# Patient Record
Sex: FEMALE | Race: WHITE | NOT HISPANIC OR LATINO
[De-identification: names, ages, dates, MRNs, and addresses within clinical notes are randomized per-mention and may not be internally consistent; named-entity substitution may affect disease eponyms.]

---
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
## 2019-02-07 ENCOUNTER — APPOINTMENT (OUTPATIENT)
Dept: PEDIATRICS | Facility: CLINIC | Age: 19
End: 2019-02-07
Payer: COMMERCIAL

## 2019-02-07 VITALS
SYSTOLIC BLOOD PRESSURE: 110 MMHG | TEMPERATURE: 98.3 F | HEART RATE: 83 BPM | BODY MASS INDEX: 36.34 KG/M2 | HEIGHT: 62.25 IN | WEIGHT: 200 LBS | OXYGEN SATURATION: 98 % | DIASTOLIC BLOOD PRESSURE: 76 MMHG

## 2019-02-07 PROCEDURE — 99214 OFFICE O/P EST MOD 30 MIN: CPT

## 2019-02-07 RX ORDER — NORETHINDRONE AND ETHINYL ESTRADIOL 0.4-0.035
KIT ORAL
Refills: 0 | Status: ACTIVE | COMMUNITY

## 2019-02-07 NOTE — PHYSICAL EXAM
[No Acute Distress] : no acute distress [Clear TM bilaterally] : clear tympanic membranes bilaterally [Nontender Cervical Lymph Nodes] : nontender cervical lymph nodes [Moves All Extremities x 4] : moves all extremities x4 [Warm, Well Perfused x4] : warm, well perfused x4 [Capillary Refill <2s] : capillary refill < 2s [Straight] : straight [Normotonic] : normotonic [NL] : warm [de-identified] : +tenderness along bilateral trapezius and along the cervical, thoracic and lumbar spine  [de-identified] : Cranial nerves 2-12 intact

## 2019-02-07 NOTE — DISCUSSION/SUMMARY
[FreeTextEntry1] : 19 yo here with whip lash injury \par neuro exam intact \par Start Motrin q 6 hours x 3 days \par Flexeril Q8 PRN pain/muscle spasm, no driving on muscle relaxant \par Can use heat pad, hot shower and rest \par If pain worsens or if patient develops numbness or tingling or other symptoms should f/u or go to ED \par Return on Saturday for recheck \par

## 2019-02-27 ENCOUNTER — APPOINTMENT (OUTPATIENT)
Dept: ENDOCRINOLOGY | Facility: CLINIC | Age: 19
End: 2019-02-27
Payer: COMMERCIAL

## 2019-02-27 ENCOUNTER — TRANSCRIPTION ENCOUNTER (OUTPATIENT)
Age: 19
End: 2019-02-27

## 2019-02-27 VITALS
SYSTOLIC BLOOD PRESSURE: 132 MMHG | HEART RATE: 70 BPM | BODY MASS INDEX: 35.52 KG/M2 | DIASTOLIC BLOOD PRESSURE: 84 MMHG | HEIGHT: 62.5 IN | WEIGHT: 198 LBS

## 2019-02-27 DIAGNOSIS — Z78.9 OTHER SPECIFIED HEALTH STATUS: ICD-10-CM

## 2019-02-27 DIAGNOSIS — Z83.3 FAMILY HISTORY OF DIABETES MELLITUS: ICD-10-CM

## 2019-02-27 DIAGNOSIS — Z82.49 FAMILY HISTORY OF ISCHEMIC HEART DISEASE AND OTHER DISEASES OF THE CIRCULATORY SYSTEM: ICD-10-CM

## 2019-02-27 PROCEDURE — 99244 OFF/OP CNSLTJ NEW/EST MOD 40: CPT

## 2019-02-27 RX ORDER — CYCLOBENZAPRINE HYDROCHLORIDE 5 MG/1
5 TABLET, FILM COATED ORAL 3 TIMES DAILY
Qty: 15 | Refills: 0 | Status: DISCONTINUED | COMMUNITY
Start: 2019-02-07 | End: 2019-02-27

## 2019-03-02 ENCOUNTER — APPOINTMENT (OUTPATIENT)
Dept: PEDIATRICS | Facility: CLINIC | Age: 19
End: 2019-03-02

## 2019-03-20 ENCOUNTER — APPOINTMENT (OUTPATIENT)
Dept: ENDOCRINOLOGY | Facility: CLINIC | Age: 19
End: 2019-03-20

## 2019-05-08 ENCOUNTER — APPOINTMENT (OUTPATIENT)
Dept: ENDOCRINOLOGY | Facility: CLINIC | Age: 19
End: 2019-05-08
Payer: COMMERCIAL

## 2019-05-08 VITALS
HEART RATE: 96 BPM | SYSTOLIC BLOOD PRESSURE: 124 MMHG | WEIGHT: 198 LBS | DIASTOLIC BLOOD PRESSURE: 80 MMHG | HEIGHT: 62.5 IN | BODY MASS INDEX: 35.52 KG/M2

## 2019-05-08 PROCEDURE — 99214 OFFICE O/P EST MOD 30 MIN: CPT

## 2019-08-01 ENCOUNTER — APPOINTMENT (OUTPATIENT)
Dept: ENDOCRINOLOGY | Facility: CLINIC | Age: 19
End: 2019-08-01

## 2019-10-08 ENCOUNTER — APPOINTMENT (OUTPATIENT)
Dept: PEDIATRICS | Facility: CLINIC | Age: 19
End: 2019-10-08
Payer: COMMERCIAL

## 2019-10-08 VITALS — HEART RATE: 112 BPM | WEIGHT: 198 LBS | TEMPERATURE: 98.2 F | OXYGEN SATURATION: 98 %

## 2019-10-08 DIAGNOSIS — R50.9 FEVER, UNSPECIFIED: ICD-10-CM

## 2019-10-08 DIAGNOSIS — Z87.828 PERSONAL HISTORY OF OTHER (HEALED) PHYSICAL INJURY AND TRAUMA: ICD-10-CM

## 2019-10-08 PROCEDURE — 99213 OFFICE O/P EST LOW 20 MIN: CPT

## 2019-10-08 RX ORDER — LEVOTHYROXINE SODIUM 50 UG/1
50 TABLET ORAL
Qty: 90 | Refills: 1 | Status: DISCONTINUED | COMMUNITY
Start: 2019-02-27 | End: 2019-10-08

## 2019-10-08 NOTE — HISTORY OF PRESENT ILLNESS
[FreeTextEntry6] : 19 years old here for follow up\par has been seen for fever twice at urgent care and last visit blood work showed mom\par she is on no meds \par still has low grade fever

## 2019-10-08 NOTE — PHYSICAL EXAM
[NL] : warm [Soft] : soft [NonTender] : non tender [Non Distended] : non distended [No Hepatosplenomegaly] : no hepatosplenomegaly

## 2019-10-08 NOTE — DISCUSSION/SUMMARY
[FreeTextEntry1] : 19 years old with fever,mono\par needs a note for college\par still has little fever\par i have advised her to come back in 3 days if fever does not subside

## 2019-10-11 ENCOUNTER — LABORATORY RESULT (OUTPATIENT)
Age: 19
End: 2019-10-11

## 2019-10-11 ENCOUNTER — APPOINTMENT (OUTPATIENT)
Dept: PEDIATRICS | Facility: CLINIC | Age: 19
End: 2019-10-11
Payer: COMMERCIAL

## 2019-10-11 VITALS
OXYGEN SATURATION: 98 % | DIASTOLIC BLOOD PRESSURE: 72 MMHG | SYSTOLIC BLOOD PRESSURE: 116 MMHG | TEMPERATURE: 98.5 F | HEART RATE: 122 BPM

## 2019-10-11 DIAGNOSIS — R50.9 FEVER, UNSPECIFIED: ICD-10-CM

## 2019-10-11 DIAGNOSIS — B27.90 INFECTIOUS MONONUCLEOSIS, UNSPECIFIED W/OUT COMPLICATION: ICD-10-CM

## 2019-10-11 DIAGNOSIS — J32.9 CHRONIC SINUSITIS, UNSPECIFIED: ICD-10-CM

## 2019-10-11 LAB
FLUAV SPEC QL CULT: NEGATIVE
S PYO AG SPEC QL IA: NEGATIVE

## 2019-10-11 PROCEDURE — 87880 STREP A ASSAY W/OPTIC: CPT | Mod: QW

## 2019-10-11 PROCEDURE — 87804 INFLUENZA ASSAY W/OPTIC: CPT | Mod: QW

## 2019-10-11 PROCEDURE — 99214 OFFICE O/P EST MOD 30 MIN: CPT

## 2019-10-11 NOTE — DISCUSSION/SUMMARY
[FreeTextEntry1] : 20 yo here with sinus infection persistent fever\par Rapid flu neg, rapid strep negative \par Sent for BW \par Will start doxycycline for sinus infection given crossreactivity of Amox and Mono \par Flonase \par Follow up PRN worsening symptoms, persistent fever of 100.4 or more or failure to improve.\par \par

## 2019-10-11 NOTE — REVIEW OF SYSTEMS
[Fever] : fever [Chills] : chills [Night Sweats] : night sweats [Nasal Congestion] : nasal congestion [Headache] : headache [Sore Throat] : sore throat [Sinus Pressure] : sinus pressure [Abdominal Pain] : abdominal pain [Negative] : Genitourinary

## 2019-10-11 NOTE — HISTORY OF PRESENT ILLNESS
[FreeTextEntry6] : LUZ NOLAN is a 19 year old female presenting for fever. \par She was [facility] and diagnosed with a febrile illness\par She was then seen again at [facility] on 10/3 and diagnosed with Mono. \par \par On 9/27 had 102 which persisted and last week up to 103. \par Tmax in the last 5 days is 101.7 and has had daily fevers of 100.4 at least once/day \par Has throat pain, pain in the glands, some abdominal pain but improving abdominal pain. \par Headache, frontal pressure worsening. \par +nasal congestion x 1.5 weeks \par \par patient cell [phone]

## 2019-10-11 NOTE — PHYSICAL EXAM
[EOMI] : EOMI [No Acute Distress] : no acute distress [Clear TM bilaterally] : clear tympanic membranes bilaterally [Inflamed Nasal Mucosa] : inflamed nasal mucosa [Erythematous Oropharynx] : erythematous oropharynx [Enlarged Tonsils] : enlarged tonsils  [+3] :  ( +3 ) [Exudate] : exudate [Tender cervical lymph nodes] : tender cervical lymph nodes  [Clear to Ausculatation Bilaterally] : clear to auscultation bilaterally [Regular Rate and Rhythm] : regular rate and rhythm [Warm] : warm

## 2019-10-12 LAB
ALBUMIN SERPL ELPH-MCNC: 3.6 G/DL
ALP BLD-CCNC: 356 U/L
ALT SERPL-CCNC: 91 U/L
ANION GAP SERPL CALC-SCNC: 14 MMOL/L
AST SERPL-CCNC: 74 U/L
BILIRUB SERPL-MCNC: 1 MG/DL
BUN SERPL-MCNC: 4 MG/DL
CALCIUM SERPL-MCNC: 8.6 MG/DL
CHLORIDE SERPL-SCNC: 104 MMOL/L
CO2 SERPL-SCNC: 21 MMOL/L
CREAT SERPL-MCNC: 0.55 MG/DL
GLUCOSE SERPL-MCNC: 102 MG/DL
POTASSIUM SERPL-SCNC: 4.3 MMOL/L
PROT SERPL-MCNC: 6.3 G/DL
SODIUM SERPL-SCNC: 139 MMOL/L

## 2019-10-13 ENCOUNTER — MOBILE ON CALL (OUTPATIENT)
Age: 19
End: 2019-10-13

## 2019-10-14 ENCOUNTER — RESULT REVIEW (OUTPATIENT)
Age: 19
End: 2019-10-14

## 2019-10-14 LAB
BACTERIA THROAT CULT: NORMAL
BASOPHILS # BLD AUTO: 0 K/UL
BASOPHILS NFR BLD AUTO: 0 %
EOSINOPHIL # BLD AUTO: 0 K/UL
EOSINOPHIL NFR BLD AUTO: 0 %
HCT VFR BLD CALC: 46 %
HGB BLD-MCNC: 14.5 G/DL
LYMPHOCYTES # BLD AUTO: 4.29 K/UL
LYMPHOCYTES NFR BLD AUTO: 36.5 %
MAN DIFF?: NORMAL
MCHC RBC-ENTMCNC: 29.7 PG
MCHC RBC-ENTMCNC: 31.5 GM/DL
MCV RBC AUTO: 94.1 FL
MONOCYTES # BLD AUTO: 0.51 K/UL
MONOCYTES NFR BLD AUTO: 4.3 %
NEUTROPHILS # BLD AUTO: 3.58 K/UL
NEUTROPHILS NFR BLD AUTO: 24.4 %
PLATELET # BLD AUTO: 266 K/UL
RAPID RVP RESULT: NOT DETECTED
RBC # BLD: 4.89 M/UL
RBC # FLD: 12.2 %
WBC # FLD AUTO: 11.75 K/UL

## 2019-10-14 RX ORDER — DOXYCYCLINE HYCLATE 100 MG/1
100 CAPSULE ORAL
Qty: 20 | Refills: 0 | Status: DISCONTINUED | COMMUNITY
Start: 2019-10-11 | End: 2019-10-14

## 2019-11-08 ENCOUNTER — APPOINTMENT (OUTPATIENT)
Dept: ENDOCRINOLOGY | Facility: CLINIC | Age: 19
End: 2019-11-08
Payer: COMMERCIAL

## 2019-11-08 VITALS
DIASTOLIC BLOOD PRESSURE: 66 MMHG | BODY MASS INDEX: 34.09 KG/M2 | WEIGHT: 190 LBS | HEART RATE: 91 BPM | SYSTOLIC BLOOD PRESSURE: 104 MMHG | HEIGHT: 62.5 IN

## 2019-11-08 LAB — HBA1C MFR BLD HPLC: 5

## 2019-11-08 PROCEDURE — 99214 OFFICE O/P EST MOD 30 MIN: CPT

## 2019-11-08 RX ORDER — CEFDINIR 300 MG/1
300 CAPSULE ORAL
Qty: 20 | Refills: 0 | Status: DISCONTINUED | COMMUNITY
Start: 2019-10-13 | End: 2019-11-08

## 2019-11-08 RX ORDER — IBUPROFEN 600 MG/1
600 TABLET, FILM COATED ORAL 3 TIMES DAILY
Qty: 30 | Refills: 0 | Status: DISCONTINUED | COMMUNITY
Start: 2019-02-07 | End: 2019-11-08

## 2019-11-08 NOTE — REASON FOR VISIT
[Follow-Up: _____] : a [unfilled] follow-up visit [FreeTextEntry1] : Hypothyroidism, PCOS, HLD, and Vitamin D Deficiency

## 2019-11-08 NOTE — ASSESSMENT
[Importance of Diet and Exercise] : importance of diet and exercise to improve glycemic control, achieve weight loss and improve cardiovascular health [Levothyroxine] : The patient was instructed to take Levothyroxine on an empty stomach, separate from vitamins, and wait at least 30 minutes before eating [FreeTextEntry1] : 20 y/o female with Hypothyroidism, PCOS, HLD, and Vitamin D Deficiency. Labs reviewed from 11/5/19 - A1C 5.0%, , Triglycerides 238, TSH 1.8, and Vitamin D 21\par \par Plan: \par Hypothyroidism: euthyroid clinically and biochemically\par - continue current dose of Levothyroxine 50 mcg daily\par - repeat TFTs in 6 months\par \par PCOS: continue OCPs\par - monitor A1C \par \par Vitamin D Deficiency: start vitamin D 2000 units daily \par - repeat labs in 6 months\par \par HLD: high triglycerides, no therapy recommended at this time\par - follow a low fat diet\par - increase routine exercise\par \par Labs and follow up visit in 6 months.

## 2019-11-08 NOTE — PHYSICAL EXAM
[Alert] : alert [No Acute Distress] : no acute distress [Well Nourished] : well nourished [No LAD] : no lymphadenopathy [Well Developed] : well developed [Thyroid Not Enlarged] : the thyroid was not enlarged [Supple] : the neck was supple [No Thyroid Nodules] : there were no palpable thyroid nodules [Normal Rate and Effort] : normal respiratory rhythm and effort [No Accessory Muscle Use] : no accessory muscle use [Normal S1, S2] : normal S1 and S2 [Normal Rate] : heart rate was normal  [Clear to Auscultation] : lungs were clear to auscultation bilaterally [Not Tender] : non-tender [Regular Rhythm] : with a regular rhythm [Normal Bowel Sounds] : normal bowel sounds [No Stigmata of Cushings Syndrome] : no stigmata of cushings syndrome [Soft] : abdomen soft [No Rash] : no rash [Normal Gait] : normal gait [No Tremors] : no tremors [No Motor Deficits] : the motor exam was normal [Oriented x3] : oriented to person, place, and time [Normal Insight/Judgement] : insight and judgment were intact [Normal Mood] : the mood was normal [Acanthosis Nigricans] : no acanthosis nigricans

## 2019-11-08 NOTE — REVIEW OF SYSTEMS
[Fatigue] : fatigue [Easy Bruising] : a tendency for easy bruising [Recent Weight Loss (___ Lbs)] : no recent weight loss [Recent Weight Gain (___ Lbs)] : no recent weight gain [Decreased Appetite] : appetite not decreased [Visual Field Defect] : no visual field defect [Blurry Vision] : no blurred vision [Dysphonia] : no dysphonia [Dysphagia] : no dysphagia [Palpitations] : no palpitations [Neck Pain] : no neck pain [Chest Pain] : no chest pain [Dry Skin] : no dry skin [Hair Loss] : no hair loss [Polyuria] : no polyuria [Dysuria] : no dysuria [Headache] : no headaches [Tremors] : no tremors [Depression] : no depression [Anxiety] : no anxiety [Heat Intolerance] : heat tolerant [Cold Intolerance] : cold tolerant [Swelling] : no swelling [FreeTextEntry2] : recently dx with mono

## 2019-11-08 NOTE — HISTORY OF PRESENT ILLNESS
[FreeTextEntry1] : Quality: Hypothyroidism \par Severity: mild \par Duration: 2/2019\par Onset: routine labs\par Modifying Factors: Better with medication \par Associated Symptoms: no neck pain, no dysphagia \par Family History of thyroid issue: Paternal Grandmother \par Family History of DM: Mother and Father \par Family History of PCOS: sister \par \par Notes:\par LMP: 10/23/19\par regular on OCPs - Balziva\par \par \par Current Regimen: Levothyroxine 50 mg daily - taking appropriately\par \par \par Labs reviewed: 11/5/19 - TSH 1.8\par \par Date of last thyroid sonogram: none\par

## 2019-11-30 ENCOUNTER — RX RENEWAL (OUTPATIENT)
Age: 19
End: 2019-11-30

## 2019-12-03 ENCOUNTER — RX RENEWAL (OUTPATIENT)
Age: 19
End: 2019-12-03

## 2020-03-22 ENCOUNTER — RX RENEWAL (OUTPATIENT)
Age: 20
End: 2020-03-22

## 2020-05-06 ENCOUNTER — LABORATORY RESULT (OUTPATIENT)
Age: 20
End: 2020-05-06

## 2020-05-07 ENCOUNTER — APPOINTMENT (OUTPATIENT)
Dept: ENDOCRINOLOGY | Facility: CLINIC | Age: 20
End: 2020-05-07
Payer: COMMERCIAL

## 2020-05-07 PROCEDURE — 36415 COLL VENOUS BLD VENIPUNCTURE: CPT

## 2020-05-08 ENCOUNTER — APPOINTMENT (OUTPATIENT)
Dept: ENDOCRINOLOGY | Facility: CLINIC | Age: 20
End: 2020-05-08
Payer: COMMERCIAL

## 2020-05-08 PROCEDURE — 99212 OFFICE O/P EST SF 10 MIN: CPT | Mod: 95

## 2020-05-08 NOTE — ASSESSMENT
[FreeTextEntry1] : 18 y/o female with Hypothyroidism, PCOS, HLD, and Vitamin D Deficiency. \par \par Plan: \par Follow up with PCP r/t white spots on tongue \par \par Hypothyroidism: euthyroid biochemically\par - continue current dose of Levothyroxine 50 mcg daily\par - repeat TFTs in 6 months\par \par PCOS: continue OCPs\par - monitor A1C \par \par Vitamin D Deficiency: continue vitamin D 2000 units daily \par - repeat labs in 6 months\par \par HLD: controlled with diet,  no therapy recommended at this time\par - follow a low fat diet\par - increase routine exercise\par \par Labs and follow up visit in 6 months. \par \par Start Time: 11:11\par End Time: 11:24

## 2020-05-08 NOTE — PHYSICAL EXAM
[Alert] : alert [No Acute Distress] : no acute distress [de-identified] : Physical exam deferred due to telehealth visit

## 2020-05-08 NOTE — HISTORY OF PRESENT ILLNESS
[Other Location: e.g. Home (Enter Location, City,State)___] : at [unfilled] [Patient] : the patient [Self] : self [Home] : at home, [unfilled] , at the time of the visit. [FreeTextEntry1] : Pt. reports having white spots on her tongue which come and go, especially with tiredness. \par \par Quality: Hypothyroidism \par Severity: mild \par Duration: 2/2019\par Onset: routine labs\par Modifying Factors: Better with medication \par Associated Symptoms: no neck pain, no dysphagia \par Family History of thyroid issue: Paternal Grandmother \par Family History of DM: Mother and Father \par Family History of PCOS: sister \par \par Notes:\par LMP: 5/6/20 - heavy \par regular on OCPs - Balziva\par Weight loss 10 pounds \par \par \par Current Regimen: Levothyroxine 50 mg daily - taking appropriately\par \par \par Labs reviewed: 5/7/20 - TSH 2.06, Free 1.6 \par A1C 5.1%, vitamin D 49\par \par Date of last thyroid sonogram: none\par

## 2020-11-25 ENCOUNTER — APPOINTMENT (OUTPATIENT)
Dept: ENDOCRINOLOGY | Facility: CLINIC | Age: 20
End: 2020-11-25
Payer: COMMERCIAL

## 2020-11-25 VITALS
SYSTOLIC BLOOD PRESSURE: 130 MMHG | WEIGHT: 184 LBS | DIASTOLIC BLOOD PRESSURE: 90 MMHG | HEART RATE: 86 BPM | OXYGEN SATURATION: 98 %

## 2020-11-25 PROCEDURE — 99214 OFFICE O/P EST MOD 30 MIN: CPT

## 2021-05-25 ENCOUNTER — APPOINTMENT (OUTPATIENT)
Dept: ENDOCRINOLOGY | Facility: CLINIC | Age: 21
End: 2021-05-25
Payer: COMMERCIAL

## 2021-05-25 VITALS
DIASTOLIC BLOOD PRESSURE: 84 MMHG | TEMPERATURE: 98.4 F | HEIGHT: 62.5 IN | OXYGEN SATURATION: 98 % | BODY MASS INDEX: 35.16 KG/M2 | HEART RATE: 94 BPM | WEIGHT: 196 LBS | SYSTOLIC BLOOD PRESSURE: 124 MMHG

## 2021-05-25 PROCEDURE — 99214 OFFICE O/P EST MOD 30 MIN: CPT

## 2021-05-25 PROCEDURE — 99072 ADDL SUPL MATRL&STAF TM PHE: CPT

## 2021-05-25 RX ORDER — FLUTICASONE PROPIONATE 50 UG/1
50 SPRAY, METERED NASAL DAILY
Qty: 16 | Refills: 1 | Status: DISCONTINUED | COMMUNITY
Start: 2019-10-11 | End: 2021-05-25

## 2021-11-22 ENCOUNTER — RX RENEWAL (OUTPATIENT)
Age: 21
End: 2021-11-22

## 2021-12-06 ENCOUNTER — TRANSCRIPTION ENCOUNTER (OUTPATIENT)
Age: 21
End: 2021-12-06

## 2021-12-09 ENCOUNTER — TRANSCRIPTION ENCOUNTER (OUTPATIENT)
Age: 21
End: 2021-12-09

## 2021-12-10 ENCOUNTER — TRANSCRIPTION ENCOUNTER (OUTPATIENT)
Age: 21
End: 2021-12-10

## 2021-12-21 ENCOUNTER — APPOINTMENT (OUTPATIENT)
Dept: ENDOCRINOLOGY | Facility: CLINIC | Age: 21
End: 2021-12-21
Payer: COMMERCIAL

## 2021-12-21 VITALS
BODY MASS INDEX: 34.81 KG/M2 | DIASTOLIC BLOOD PRESSURE: 80 MMHG | SYSTOLIC BLOOD PRESSURE: 122 MMHG | HEART RATE: 85 BPM | HEIGHT: 62.5 IN | WEIGHT: 194 LBS

## 2021-12-21 PROCEDURE — 99214 OFFICE O/P EST MOD 30 MIN: CPT

## 2021-12-21 RX ORDER — LEVOTHYROXINE SODIUM 0.05 MG/1
50 TABLET ORAL
Qty: 30 | Refills: 1 | Status: COMPLETED | COMMUNITY
Start: 2020-03-22 | End: 2021-12-21

## 2022-06-08 ENCOUNTER — NON-APPOINTMENT (OUTPATIENT)
Age: 22
End: 2022-06-08

## 2022-06-13 ENCOUNTER — APPOINTMENT (OUTPATIENT)
Dept: ENDOCRINOLOGY | Facility: CLINIC | Age: 22
End: 2022-06-13
Payer: COMMERCIAL

## 2022-06-13 ENCOUNTER — APPOINTMENT (OUTPATIENT)
Dept: ENDOCRINOLOGY | Facility: CLINIC | Age: 22
End: 2022-06-13

## 2022-06-13 VITALS
WEIGHT: 186 LBS | DIASTOLIC BLOOD PRESSURE: 80 MMHG | OXYGEN SATURATION: 98 % | HEART RATE: 93 BPM | BODY MASS INDEX: 33.37 KG/M2 | HEIGHT: 62.5 IN | SYSTOLIC BLOOD PRESSURE: 135 MMHG

## 2022-06-13 PROCEDURE — 99214 OFFICE O/P EST MOD 30 MIN: CPT

## 2022-06-13 NOTE — REVIEW OF SYSTEMS
[Recent Weight Loss (___ Lbs)] : recent weight loss: [unfilled] lbs [Fatigue] : no fatigue [Recent Weight Gain (___ Lbs)] : no recent weight gain [Dysphagia] : no dysphagia [Neck Pain] : no neck pain [Chest Pain] : no chest pain [Palpitations] : no palpitations [Shortness Of Breath] : no shortness of breath [Nausea] : no nausea [Constipation] : no constipation [Vomiting] : no vomiting [Diarrhea] : no diarrhea [Polyuria] : no polyuria [Polydipsia] : no polydipsia

## 2022-06-13 NOTE — REASON FOR VISIT
[Follow - Up] : a follow-up visit [Hypothyroidism] : hypothyroidism [PCOS] : PCOS [Other___] : [unfilled] Follow up with your pediatrician in 1 day

## 2022-06-13 NOTE — ASSESSMENT
[Levothyroxine] : The patient was instructed to take Levothyroxine on an empty stomach, separate from vitamins, and wait at least 30 minutes before eating [FreeTextEntry1] : 21F obese w/ PCOS, vitamin D deficiency, hypothyroidism and HLD here for follow up. has fatigue\par rtc in 6 months with labs\par \par Hypothyroidism- TSH close to goal. switch unithroid ZURI  and increase dose 75mcg as patient went gluten free. Pt felt better on Unithroid brand, sample given, will repeat tft's in 6 weeks\par \par PCOS- manageable on OCPs. OGTT in the past has been normal. Cotninue with diet and exercise efforts \par Vitamin D deficiency- mild. monitor for now, continue over the counter VIt D \par \par HLD- mildly high TG. no need for therapy. can monitor. possibly an OCP effect vs diet.\par  \par \par RTO in 6 months with Dr. Larkin\par

## 2022-06-13 NOTE — HISTORY OF PRESENT ILLNESS
[FreeTextEntry1] : initial visit\par symptoms now:\par no hoarseness/dysphagia/dyspnea\par fatigue is improving \par \par known thyroid issue since: 2/2019\par thyroid levels checked because: fatigue\par thyroid ultrasound: none\par \par LMP: regular\par but previously was skipping months\par OCPs started 2/2017\par OCPs improved hirsutism\par FH of diabetes mom, dad\par FH of PCOS in sister\par plans for pregnancy: none\par Has GYN appointment today \par \par menarche: 9 yo\par \par FH of thyroid issue: paternal grandmother\par FH of thyroid cancer: none\par external beam radiation: none \par interval history\par went gluten free, going to pescaterian diet\par labs reviewed0 TSH 3.32\par not much exercise. [place] a [place] gym [place]- with pool\par working as a data analysis\par graduated with masters  comp-sci degree. might be working or doing phd afterwards\par Has been doing pure barre \par \par regular menses, on OCPs \par weight changes: walking\par \par taking Unithroid 50mcg daily, takes appropriately, felt better on Brand (Pharmacy gave her generic last month, wants to get back to Brand)\par normal ogtt in the past, 18 yo\par but sister 23yo has prediabetes \par

## 2022-06-27 ENCOUNTER — RX RENEWAL (OUTPATIENT)
Age: 22
End: 2022-06-27

## 2022-07-29 ENCOUNTER — NON-APPOINTMENT (OUTPATIENT)
Age: 22
End: 2022-07-29

## 2022-09-08 ENCOUNTER — RX RENEWAL (OUTPATIENT)
Age: 22
End: 2022-09-08

## 2022-11-16 ENCOUNTER — RX RENEWAL (OUTPATIENT)
Age: 22
End: 2022-11-16

## 2022-12-21 ENCOUNTER — APPOINTMENT (OUTPATIENT)
Dept: ENDOCRINOLOGY | Facility: CLINIC | Age: 22
End: 2022-12-21

## 2022-12-21 VITALS
SYSTOLIC BLOOD PRESSURE: 118 MMHG | HEART RATE: 89 BPM | OXYGEN SATURATION: 97 % | DIASTOLIC BLOOD PRESSURE: 78 MMHG | WEIGHT: 194 LBS | BODY MASS INDEX: 34.81 KG/M2 | HEIGHT: 62.5 IN

## 2022-12-21 PROCEDURE — 99214 OFFICE O/P EST MOD 30 MIN: CPT

## 2023-04-05 DIAGNOSIS — Z13.1 ENCOUNTER FOR SCREENING FOR DIABETES MELLITUS: ICD-10-CM

## 2023-05-14 ENCOUNTER — RX RENEWAL (OUTPATIENT)
Age: 23
End: 2023-05-14

## 2023-05-29 ENCOUNTER — RX RENEWAL (OUTPATIENT)
Age: 23
End: 2023-05-29

## 2023-06-27 LAB — TSH SERPL-ACNC: 3.33

## 2023-06-28 ENCOUNTER — OFFICE (OUTPATIENT)
Dept: URBAN - METROPOLITAN AREA CLINIC 12 | Facility: CLINIC | Age: 23
Setting detail: OPHTHALMOLOGY
End: 2023-06-28
Payer: COMMERCIAL

## 2023-06-28 ENCOUNTER — APPOINTMENT (OUTPATIENT)
Dept: ENDOCRINOLOGY | Facility: CLINIC | Age: 23
End: 2023-06-28
Payer: COMMERCIAL

## 2023-06-28 ENCOUNTER — RX RENEWAL (OUTPATIENT)
Age: 23
End: 2023-06-28

## 2023-06-28 VITALS
HEIGHT: 62.5 IN | SYSTOLIC BLOOD PRESSURE: 104 MMHG | OXYGEN SATURATION: 99 % | DIASTOLIC BLOOD PRESSURE: 66 MMHG | HEART RATE: 70 BPM | WEIGHT: 200 LBS | BODY MASS INDEX: 35.88 KG/M2

## 2023-06-28 VITALS — HEIGHT: 55 IN

## 2023-06-28 DIAGNOSIS — H52.13: ICD-10-CM

## 2023-06-28 DIAGNOSIS — H40.013: ICD-10-CM

## 2023-06-28 PROCEDURE — 92014 COMPRE OPH EXAM EST PT 1/>: CPT | Performed by: OPTOMETRIST

## 2023-06-28 PROCEDURE — 92133 CPTRZD OPH DX IMG PST SGM ON: CPT | Performed by: OPTOMETRIST

## 2023-06-28 PROCEDURE — 99215 OFFICE O/P EST HI 40 MIN: CPT

## 2023-06-28 PROCEDURE — 92083 EXTENDED VISUAL FIELD XM: CPT | Performed by: OPTOMETRIST

## 2023-06-28 PROCEDURE — 92015 DETERMINE REFRACTIVE STATE: CPT | Performed by: OPTOMETRIST

## 2023-06-28 RX ORDER — LEVOTHYROXINE SODIUM 75 UG/1
75 TABLET ORAL
Qty: 40 | Refills: 0 | Status: COMPLETED | COMMUNITY
Start: 2022-12-21 | End: 2023-06-28

## 2023-06-28 ASSESSMENT — KERATOMETRY
OS_AXISANGLE_DEGREES: 113
OS_K2POWER_DIOPTERS: 43.75
OD_K2POWER_DIOPTERS: 43.25
OD_K1POWER_DIOPTERS: 42.75
METHOD_AUTO_MANUAL: AUTO
OD_AXISANGLE_DEGREES: 176
OS_K1POWER_DIOPTERS: 43.25

## 2023-06-28 ASSESSMENT — REFRACTION_MANIFEST
OD_SPHERE: -3.00
OS_SPHERE: -3.25
OD_CYLINDER: -0.25
OD_SPHERE: -3.00
OS_CYLINDER: SPH
OS_CYLINDER: SPH
OD_VA1: 20/20
OS_VA1: 20/20
OD_VA1: 20/20
OD_CYLINDER: -0.25
OS_SPHERE: -3.25
OS_AXIS: 000
OS_VA1: 20/20
OD_AXIS: 090
OD_AXIS: 090

## 2023-06-28 ASSESSMENT — REFRACTION_CURRENTRX
OD_OVR_VA: 20/
OS_AXIS: 105
OD_AXIS: 91
OS_CYLINDER: -0.25
OS_VPRISM_DIRECTION: SV
OS_SPHERE: -3.25
OS_OVR_VA: 20/
OD_VPRISM_DIRECTION: SV
OD_SPHERE: -3.00
OD_CYLINDER: -0.50

## 2023-06-28 ASSESSMENT — REFRACTION_AUTOREFRACTION
OD_AXIS: 86
OD_SPHERE: -3.00
OS_SPHERE: -3.00
OD_CYLINDER: -0.50
OS_CYLINDER: -0.25
OS_AXIS: 30

## 2023-06-28 ASSESSMENT — VISUAL ACUITY
OS_BCVA: 20/20
OD_BCVA: 20/20

## 2023-06-28 ASSESSMENT — AXIALLENGTH_DERIVED
OD_AL: 25.1367
OD_AL: 25.0815
OD_AL: 25.0815
OS_AL: 24.8757

## 2023-06-28 ASSESSMENT — PACHYMETRY
OS_CT_UM: 616
OS_CT_CORRECTION: -5
OD_CT_CORRECTION: -5
OD_CT_UM: 616

## 2023-06-28 ASSESSMENT — CONFRONTATIONAL VISUAL FIELD TEST (CVF)
OS_FINDINGS: FULL
OD_FINDINGS: FULL

## 2023-06-28 ASSESSMENT — SPHEQUIV_DERIVED
OD_SPHEQUIV: -3.125
OD_SPHEQUIV: -3.125
OS_SPHEQUIV: -3.125
OD_SPHEQUIV: -3.25

## 2023-06-28 ASSESSMENT — TONOMETRY
OS_IOP_MMHG: 17
OD_IOP_MMHG: 19

## 2023-07-24 ENCOUNTER — RX RENEWAL (OUTPATIENT)
Age: 23
End: 2023-07-24

## 2023-10-31 ENCOUNTER — NON-APPOINTMENT (OUTPATIENT)
Age: 23
End: 2023-10-31

## 2023-11-01 ENCOUNTER — APPOINTMENT (OUTPATIENT)
Dept: ENDOCRINOLOGY | Facility: CLINIC | Age: 23
End: 2023-11-01
Payer: COMMERCIAL

## 2023-11-01 PROCEDURE — 99214 OFFICE O/P EST MOD 30 MIN: CPT | Mod: 95

## 2024-04-18 ENCOUNTER — RX RENEWAL (OUTPATIENT)
Age: 24
End: 2024-04-18

## 2024-04-18 RX ORDER — CHOLECALCIFEROL (VITAMIN D3) 1250 MCG
1.25 MG CAPSULE ORAL
Qty: 12 | Refills: 1 | Status: ACTIVE | COMMUNITY
Start: 2022-12-21 | End: 1900-01-01

## 2024-05-23 ENCOUNTER — APPOINTMENT (OUTPATIENT)
Dept: ENDOCRINOLOGY | Facility: CLINIC | Age: 24
End: 2024-05-23

## 2024-05-23 LAB — TSH SERPL-ACNC: 2.32

## 2024-05-24 ENCOUNTER — APPOINTMENT (OUTPATIENT)
Dept: ENDOCRINOLOGY | Facility: CLINIC | Age: 24
End: 2024-05-24
Payer: COMMERCIAL

## 2024-05-24 VITALS
HEIGHT: 62.5 IN | WEIGHT: 207 LBS | HEART RATE: 72 BPM | DIASTOLIC BLOOD PRESSURE: 78 MMHG | BODY MASS INDEX: 37.14 KG/M2 | OXYGEN SATURATION: 97 % | SYSTOLIC BLOOD PRESSURE: 110 MMHG

## 2024-05-24 DIAGNOSIS — E55.9 VITAMIN D DEFICIENCY, UNSPECIFIED: ICD-10-CM

## 2024-05-24 DIAGNOSIS — E03.9 HYPOTHYROIDISM, UNSPECIFIED: ICD-10-CM

## 2024-05-24 DIAGNOSIS — E28.2 POLYCYSTIC OVARIAN SYNDROME: ICD-10-CM

## 2024-05-24 DIAGNOSIS — E78.5 HYPERLIPIDEMIA, UNSPECIFIED: ICD-10-CM

## 2024-05-24 PROCEDURE — G2211 COMPLEX E/M VISIT ADD ON: CPT | Mod: NC,1L

## 2024-05-24 PROCEDURE — 99214 OFFICE O/P EST MOD 30 MIN: CPT

## 2024-05-24 RX ORDER — LEVOTHYROXINE SODIUM 88 UG/1
88 TABLET ORAL
Qty: 90 | Refills: 1 | Status: ACTIVE | COMMUNITY
Start: 2021-12-21 | End: 1900-01-01

## 2024-05-24 NOTE — HISTORY OF PRESENT ILLNESS
[FreeTextEntry1] : INITIAL VISIT: No hoarseness/dysphagia/dyspnea Always tired  Known thyroid issue since: 2/2019 Thyroid levels checked because: fatigue Thyroid ultrasound: none  LMP: regular, but previously was skipping months OCPs started 2/2017 OCPs improved hirsutism FH of diabetes: mom, dad FH of PCOS: sister Plans for pregnancy: none  Menarche: age 10  FH of thyroid issue: paternal grandmother FH of thyroid cancer: none External beam radiation: none   Here for hypothyroidism and PCOS  INTERVAL HISTORY: Lives in [place], moved 2 years ago, but comes here for medical care. Has a boyfriend, since Jan 2023, who is an . Moving in together. 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  Acne: minimal Hirsutism: yes but stable LMP: 5/24/24, regular on OCPs.  Weight: seeing weight loss specialist in [place] for the past two months, muscle mass increased at last appointment. Discussed GLP1 agonist therapy with them and wants to discuss further today.  Diet: Gluten free, not diary free. Increased protein  grams daily. Balancing meals with carb and fiber. Breakfast: egg whites with gluten free bagel Lunch: depends,  joes Dinner: burgers and fries Snack: Chobani yogurt  Exercise: Pure-[place] 4-5x/week  Normal GTT in the past (when 17 years old) Older sister has prediabetes =============================== Hypothyroidism Current regimen: Unithroid (ZURI) 88mcg daily, takes appropriately Feels better on brand than generic

## 2024-05-24 NOTE — PHYSICAL EXAM
[Alert] : alert [Well Nourished] : well nourished [Obese] : obese [No Acute Distress] : no acute distress [Well Developed] : well developed [Normal Sclera/Conjunctiva] : normal sclera/conjunctiva [EOMI] : extra ocular movement intact [No Proptosis] : no proptosis [Thyroid Not Enlarged] : the thyroid was not enlarged [No Thyroid Nodules] : no palpable thyroid nodules [No Respiratory Distress] : no respiratory distress [No Accessory Muscle Use] : no accessory muscle use [Clear to Auscultation] : lungs were clear to auscultation bilaterally [Normal S1, S2] : normal S1 and S2 [Normal Rate] : heart rate was normal [Regular Rhythm] : with a regular rhythm [No Edema] : no peripheral edema [Normal Anterior Cervical Nodes] : no anterior cervical lymphadenopathy [Normal Posterior Cervical Nodes] : no posterior cervical lymphadenopathy [No Tremors] : no tremors [Oriented x3] : oriented to person, place, and time [Normal Affect] : the affect was normal [Normal Mood] : the mood was normal

## 2024-05-24 NOTE — ASSESSMENT
[FreeTextEntry1] : 23 year old female with hypothyroidism, PCOS and associated obesity.   1. Hypothyroidism- euthyroid on replacement T4. -Continue Unithroid. -Repeat TFTs in 6 months.  2. PCOS -For menstrual irregularity: menses regular on OCPs. Continue for now. Discussed coming off OCPs prior to desire for pregnancy to see if menses are normal. Can consider Metformin. -For hirsutism/acne: manageable, does not need meds at this time. Some improvement with diet and weight loss. OCPs probably helping too.  -For metabolic issues: Patient is seeing weight loss clinic in [place]. Will continue with diet and exercise. Discussed GLP1 agonist therapy, side effects, mechanism of action, long term use/weaning, and current supply issues. Patient will consider and discuss further with weight loss clinic. Also discussed Metformin for marginal weight loss. Repeat A1c with next labs in 6 months.  3. Vitamin D deficiency -Continue vitamin D 50,000 IU weekly. -Repeat with next labs in 6 months.  4. Hyperlipidemia- triglycerides improving. -Continue low fat, low carb, high lean protein diet. -Repeat lipids in  6 months.

## 2024-05-24 NOTE — REVIEW OF SYSTEMS
[Abdominal Pain] : abdominal pain [Diarrhea] : diarrhea [Fatigue] : no fatigue [Recent Weight Gain (___ Lbs)] : no recent weight gain [Recent Weight Loss (___ Lbs)] : no recent weight loss [Dysphagia] : no dysphagia [Neck Pain] : no neck pain [Dysphonia] : no dysphonia [Chest Pain] : no chest pain [Palpitations] : no palpitations [Shortness Of Breath] : no shortness of breath [Nausea] : no nausea [Constipation] : no constipation [Vomiting] : no vomiting [Polyuria] : no polyuria [Irregular Menses] : regular menses [Tremors] : no tremors [Pain/Numbness of Digits] : no pain/numbness of digits [Depression] : no depression [Anxiety] : no anxiety [Polydipsia] : no polydipsia [Cold Intolerance] : no cold intolerance [Heat Intolerance] : no heat intolerance

## 2024-12-03 ENCOUNTER — APPOINTMENT (OUTPATIENT)
Dept: ENDOCRINOLOGY | Facility: CLINIC | Age: 24
End: 2024-12-03